# Patient Record
Sex: MALE | Race: ASIAN | NOT HISPANIC OR LATINO | Employment: PART TIME | URBAN - METROPOLITAN AREA
[De-identification: names, ages, dates, MRNs, and addresses within clinical notes are randomized per-mention and may not be internally consistent; named-entity substitution may affect disease eponyms.]

---
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
## 2023-08-12 ENCOUNTER — HEALTH MAINTENANCE LETTER (OUTPATIENT)
Age: 18
End: 2023-08-12

## 2023-11-22 ENCOUNTER — OFFICE VISIT (OUTPATIENT)
Dept: FAMILY MEDICINE | Facility: CLINIC | Age: 18
End: 2023-11-22
Payer: COMMERCIAL

## 2023-11-22 ENCOUNTER — HOSPITAL ENCOUNTER (OUTPATIENT)
Dept: GENERAL RADIOLOGY | Facility: HOSPITAL | Age: 18
Discharge: HOME | End: 2023-11-22
Attending: FAMILY MEDICINE | Admitting: FAMILY MEDICINE

## 2023-11-22 VITALS
DIASTOLIC BLOOD PRESSURE: 87 MMHG | WEIGHT: 170.1 LBS | SYSTOLIC BLOOD PRESSURE: 136 MMHG | TEMPERATURE: 97.6 F | BODY MASS INDEX: 29.2 KG/M2 | OXYGEN SATURATION: 98 % | RESPIRATION RATE: 18 BRPM | HEART RATE: 83 BPM

## 2023-11-22 DIAGNOSIS — M79.675 PAIN OF TOE OF LEFT FOOT: ICD-10-CM

## 2023-11-22 DIAGNOSIS — M79.675 PAIN OF TOE OF LEFT FOOT: Primary | ICD-10-CM

## 2023-11-22 DIAGNOSIS — S90.212A SUBUNGUAL HEMATOMA OF GREAT TOE OF LEFT FOOT, INITIAL ENCOUNTER: ICD-10-CM

## 2023-11-22 PROCEDURE — 73660 X-RAY EXAM OF TOE(S): CPT | Mod: LT

## 2023-11-22 PROCEDURE — 99213 OFFICE O/P EST LOW 20 MIN: CPT | Performed by: FAMILY MEDICINE

## 2023-11-22 RX ORDER — FAMOTIDINE 20 MG/1
20 TABLET, FILM COATED ORAL
COMMUNITY
Start: 2023-09-28

## 2023-11-22 RX ORDER — FLUTICASONE PROPIONATE 50 MCG
2 SPRAY, SUSPENSION (ML) NASAL
COMMUNITY
Start: 2023-09-28

## 2023-11-22 RX ORDER — CETIRIZINE HYDROCHLORIDE 10 MG/1
10 TABLET ORAL DAILY
COMMUNITY
Start: 2023-09-28

## 2023-11-22 NOTE — LETTER
REPORT OF WORK COMP    [address] [address]  [phone]      PATIENT DATA    Employee Name: Markell Toribio      : 2005     #: [id number]    Work related injury: Yes  Employer at time of injury: [employer]  Employer contact & phone:   Employed elsewhere? Unknown  Workers' Compensation Carrier/Managed Care Plan:      Today's date: 2023  Date of injury: 2023  Date of first visit: 2023    PROVIDER EVALUATION: Please fill in as needed.  Please give copy to employee for employer.    1. Diagnosis: left great toe hematoma and pain    2. Treatment: I used cautery to release the hematoma of nail.  3. Medication: ice  NOTE: When ordering a medication, [place] Rules require Work Comp or WC on prescriptions.    4. No work from   5. Return to work date:    ** WITH RESTRICTIONS? Yes, with work restrictions: he will wear the post op shoe until toe is better than no restrictions      RESTRICTIONS: Unlimited unless listed.  Restrictions apply to home and leisure also.  If work restrictions is not available, the employee is totally disabled.    Maximum Medical Improvement (Date):   Any Permanent Partial Disability? Deferred to future exam/consult.    Provider comments:     Medical Examiner: Luis Delgadillo MD          License or registration: [id number]    Next appointment: As needed    CC: Employer, Managed Care Plan/Payor, Patient

## 2023-11-22 NOTE — PROGRESS NOTES
Assessment & Plan     Pain of toe of left foot  No fracture on xray  Post op shoe  - XR Toe Left G/E 2 Views  - Ankle/Foot Bracing Supplies DME Post-op Shoe; Left    Subungual hematoma of great toe of left foot, initial encounter  Used cautery to release hematoma  - Ankle/Foot Bracing Supplies DME Post-op Shoe; Left             No follow-ups on file.    Luis Delgadillo MD  [facility] BRIAN Guillaume is a 18 year old male who presents to clinic today for the following health issues:  Chief Complaint   Patient presents with    Foot Pain     Dropped metal plate on Lt foot yesterday, unable to feel toe, able to walk a bit, bruising on the nail and swelling, some blood clot on nail     HPI    Was at work  [employer] yesterday while working.  Dropped metal trashbin on left toe.  Bruised.  Has tried icing.        Review of Systems        Objective    /87   Pulse 83   Temp 97.6  F (36.4  C) (Oral)   Resp 18   Wt 77.2 kg (170 lb 1.6 oz)   SpO2 98%   BMI 29.20 kg/m    Physical Exam  Vitals and nursing note reviewed.   Constitutional:       Appearance: Normal appearance.   Musculoskeletal:         General: Swelling, tenderness and signs of injury present. No deformity.      Comments: Under great toe with bruising/ecchymosis   Neurological:      Mental Status: He is alert.

## 2024-06-05 NOTE — TELEPHONE ENCOUNTER
Coronavirus (COVID-19) Notification    Reason for call  Notify of POSITIVE  COVID-19 lab result, assess symptoms,  review [facility] recommendations    Lab Result   Lab test for 2019-nCoV rRt-PCR or SARS-COV-2 PCR  Oropharyngeal AND/OR nasopharyngeal swabs were POSITIVE for 2019-nCoV RNA [OR] SARS-COV-2 RNA (COVID-19) RNA     We have been unable to reach Patient by phone at this time to notify of their Positive COVID-19 result.  Left voicemail message requesting a call back to [phone] [facility] for results.        POSITIVE COVID-19 Letter sent.    Fernanda Marlow LPN       3t

## 2024-10-05 ENCOUNTER — HEALTH MAINTENANCE LETTER (OUTPATIENT)
Age: 19
End: 2024-10-05